# Patient Record
Sex: FEMALE | Race: WHITE | NOT HISPANIC OR LATINO | URBAN - METROPOLITAN AREA
[De-identification: names, ages, dates, MRNs, and addresses within clinical notes are randomized per-mention and may not be internally consistent; named-entity substitution may affect disease eponyms.]

---
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
## 2025-01-03 ENCOUNTER — TREATMENT (OUTPATIENT)
Dept: PHYSICAL THERAPY | Facility: CLINIC | Age: 20
End: 2025-01-03
Payer: COMMERCIAL

## 2025-01-03 DIAGNOSIS — M25.50 ARTHRALGIA, UNSPECIFIED JOINT: Primary | ICD-10-CM

## 2025-01-03 DIAGNOSIS — M35.7 BENIGN JOINT HYPERMOBILITY SYNDROME: ICD-10-CM

## 2025-01-03 PROCEDURE — 97112 NEUROMUSCULAR REEDUCATION: CPT | Mod: GP

## 2025-01-03 PROCEDURE — 97110 THERAPEUTIC EXERCISES: CPT | Mod: GP

## 2025-01-03 NOTE — PROGRESS NOTES
Physical Therapy Treatment    Patient Name: Norma Starr  MRN: [id number]    Today's Date: 1/3/2025    Insurance:  Visit number: 2/4  Insurance Type: Payor: MARY / Plan: ANTHEM HMP / Product Type: *No Product type* /   Authorization or Plan of Care date Range: DATes 12/12/24-1/10/25 4 visit       Diagnosis:   1. Arthralgia, unspecified joint        2. Benign joint hypermobility syndrome            Precautions:  Asthma, anemia, Possible POTS   Fall Risk: None    SUBJECTIVE:   Having EMG RUE. Has abdominal soreness from holiday - feels like a pulled muscle   Compliant with HEP = yes     OBJECTIVE:  107/67 HR 92    TREATMENT:  - self management - PNE     Nustep level 4 5 min.     NBOS on air ex  60 sec x 3 alessandro     Romberg 30 sec x 3 alessandro     Prone PPU  Prone hip IR ER  Prone ham curls   Hip flex stretch     Standing x 10 alessandro on air ex:  CR  Step taps   Hip abd, extension  Side step taps   Step up     seated alessandro 12 x:   Diaphragmatic breathing x 4   Shoulder flexion, bench press with wand x 12 each   Shoulder PNF AROM   Shoulder wand       ASSESSMENT:  Pt had lower abdominal muscle soreness from over the holiday. Prone stretching provided. Pt able to elizabeth well with no reports of pain during exercise progression. Pt with improved elizabeth of activity. Completed CKC exercise well with no UE support. Advancing well. Pt to have EMG next week for RUE.       PLAN:    Progress as able to elizabeth. Use of protocol and assess reaction/tolerance.     Access Code: [email or web address]  URL: [email or web address]  Date: 01/03/2025  Prepared by: Padma Briones    Exercises  - Prone Press Up On Elbows  - 1-2 x daily - 7 x weekly - 1-2 sets - 5 reps - 5 hold  - Prone Knee Flexion AROM  - 1-2 x daily - 7 x weekly - 1-2 sets - 10 reps  - Prone Alternating Bent Leg Fallout  - 1-2 x daily - 7 x weekly - 1-2 sets - 10 reps  - Prone Figure 4 Stretch  - 1-2 x daily - 7 x weekly - 1-2 sets - 5 reps - 15 hold  - Downward Dog  - 1-2 x daily - 7  x weekly - 1-2 sets - 5 reps - 15 hold  - Half Kneeling Dorsiflexion Stretch at Wall  - 1-2 x daily - 7 x weekly - 1-2 sets - 5 reps - 15 hold  - Standing Gastroc Stretch  - 1-2 x daily - 7 x weekly - 1-2 sets - 5 reps - 15 hold    Billing:     PT Therapeutic Procedures Time Entry  Neuromuscular Re-Education Time Entry: 20  Therapeutic Exercise Time Entry: 20

## 2025-01-06 ENCOUNTER — APPOINTMENT (OUTPATIENT)
Dept: INTEGRATIVE MEDICINE | Facility: CLINIC | Age: 20
End: 2025-01-06
Payer: COMMERCIAL

## 2025-01-09 ENCOUNTER — HOSPITAL ENCOUNTER (OUTPATIENT)
Dept: NEUROLOGY | Facility: CLINIC | Age: 20
Discharge: HOME | End: 2025-01-09
Payer: MEDICAID

## 2025-01-09 DIAGNOSIS — R20.2 PARESTHESIAS: ICD-10-CM

## 2025-01-09 PROCEDURE — 95886 MUSC TEST DONE W/N TEST COMP: CPT | Performed by: PSYCHIATRY & NEUROLOGY

## 2025-01-09 PROCEDURE — 95910 NRV CNDJ TEST 7-8 STUDIES: CPT | Performed by: PSYCHIATRY & NEUROLOGY

## 2025-01-10 ENCOUNTER — APPOINTMENT (OUTPATIENT)
Dept: PHYSICAL THERAPY | Facility: CLINIC | Age: 20
End: 2025-01-10
Payer: COMMERCIAL

## 2025-01-14 ENCOUNTER — APPOINTMENT (OUTPATIENT)
Dept: NEUROLOGY | Facility: CLINIC | Age: 20
End: 2025-01-14
Payer: COMMERCIAL

## 2025-01-15 ENCOUNTER — HOSPITAL ENCOUNTER (OUTPATIENT)
Dept: NEUROLOGY | Facility: HOSPITAL | Age: 20
Discharge: HOME | End: 2025-01-15
Payer: MEDICAID

## 2025-01-15 ENCOUNTER — TELEPHONE (OUTPATIENT)
Dept: NEUROLOGY | Facility: CLINIC | Age: 20
End: 2025-01-15

## 2025-01-15 DIAGNOSIS — R00.0 TACHYCARDIA: ICD-10-CM

## 2025-01-15 PROCEDURE — 95923 AUTONOMIC NRV SYST FUNJ TEST: CPT | Performed by: STUDENT IN AN ORGANIZED HEALTH CARE EDUCATION/TRAINING PROGRAM

## 2025-01-15 PROCEDURE — 95924 ANS PARASYMP & SYMP W/TILT: CPT | Performed by: STUDENT IN AN ORGANIZED HEALTH CARE EDUCATION/TRAINING PROGRAM

## 2025-01-15 NOTE — TELEPHONE ENCOUNTER
----- Message from Trang QUEZADA sent at 1/14/2025 12:46 PM EST -----  Sent mychart.  ----- Message -----  From: Radha Georges DO  Sent: 1/14/2025  12:26 PM EST  To: Trang Callaway MA    Please let the patient know that her EMG study was normal. When symptoms are intermittent, it can often be normal.  She could still try wearing a carpal tunnel wrist splint at night and see if that helps.

## 2025-01-16 ENCOUNTER — OFFICE VISIT (OUTPATIENT)
Dept: URGENT CARE | Age: 20
End: 2025-01-16
Payer: MEDICAID

## 2025-01-16 ENCOUNTER — APPOINTMENT (OUTPATIENT)
Dept: PRIMARY CARE | Facility: CLINIC | Age: 20
End: 2025-01-16
Payer: MEDICAID

## 2025-01-16 DIAGNOSIS — N92.2 EXCESSIVE MENSTRUATION AT PUBERTY: ICD-10-CM

## 2025-01-16 DIAGNOSIS — R04.0 EPISTAXIS: Primary | ICD-10-CM

## 2025-01-16 PROCEDURE — 99203 OFFICE O/P NEW LOW 30 MIN: CPT | Performed by: PHYSICIAN ASSISTANT

## 2025-01-16 NOTE — PROGRESS NOTES
"Urgent Care Virtual Video Visit    Patient Location: home  Provider Location: [facility]    Video visit completed with realtime synchronous video/audio connection. Informed consent was obtained from the patient. Patient was made aware that my evaluation and diagnosis are limited due to the fact that we are not in the same room during the interview and that this is a virtual encounter that took place via videoconferencing. Patient verbalized understanding.     I have communicated my name and active licensure. The pt identify and physical location were verified at the time of the visit. The patient has been informed of the risks and benefits of and alternatives to treatment through a remote evaluation and consents to proceed with the evaluation remotely. This visit was conducted using video and audio.    HPI-  This is a 19 yr old female requesting evaluation for nosebleed x 3 since last night and heavy menstrual bleeding that started last night. Feels \"cold\", \"shaky\" and fatigued. No illness sxs.    ROS-as stated in he HPI, is otherwise non contributory    PE-In NAD, conversant  No exam, virtual platform    Assessment  Epistaxis  menorrhagia    Plan:  Pt states she feels \"dehydrated\"  Advised ER visit for labs, and hydration if indicated. Unable to perform these tasks in UC setting    Patient disposition: Home    Electronically signed by Radha Schumacher PA-C  3:15 PM    "

## 2025-02-07 ENCOUNTER — OFFICE VISIT (OUTPATIENT)
Dept: URGENT CARE | Age: 20
End: 2025-02-07
Payer: MEDICAID

## 2025-02-07 VITALS
TEMPERATURE: 98.2 F | RESPIRATION RATE: 16 BRPM | OXYGEN SATURATION: 100 % | BODY MASS INDEX: 23.95 KG/M2 | SYSTOLIC BLOOD PRESSURE: 97 MMHG | DIASTOLIC BLOOD PRESSURE: 55 MMHG | HEART RATE: 88 BPM | WEIGHT: 135.2 LBS

## 2025-02-07 DIAGNOSIS — J02.8 PHARYNGITIS DUE TO OTHER ORGANISM: Primary | ICD-10-CM

## 2025-02-07 DIAGNOSIS — R68.89 FLU-LIKE SYMPTOMS: ICD-10-CM

## 2025-02-07 LAB
POC RAPID INFLUENZA A: NEGATIVE
POC RAPID INFLUENZA B: NEGATIVE
POC RAPID STREP: NEGATIVE
POC SARS-COV-2 AG BINAX: NORMAL

## 2025-02-07 ASSESSMENT — PATIENT HEALTH QUESTIONNAIRE - PHQ9
SUM OF ALL RESPONSES TO PHQ9 QUESTIONS 1 AND 2: 0
2. FEELING DOWN, DEPRESSED OR HOPELESS: NOT AT ALL
1. LITTLE INTEREST OR PLEASURE IN DOING THINGS: NOT AT ALL

## 2025-02-07 NOTE — LETTER
February 7, 2025     Patient: Norma Starr   YOB: 2005   Date of Visit: 2/7/2025       To Whom It May Concern:    It is my medical opinion that Norma Starr  May return to work when no fever x 24 hours .    If you have any questions or concerns, please don't hesitate to call.         Sincerely,        Bibiana Harris, JOANN-CNP    CC: No Recipients

## 2025-02-07 NOTE — PROGRESS NOTES
SUBJECTIVE:   Norma Starr is a 19 y.o. adult who complains of congestion, sore throat, swollen glands, nasal blockage, dry cough, and fever for 5 days. Norma denies a history of chest pain, shortness of breath, wheezing, and sputum production and has a history of asthma. Patient denies smoke cigarettes.     OBJECTIVE:  Norma appears well, vital signs are as noted. Ears normal.  Throat and pharynx normal.  Neck supple. No adenopathy in the neck. Nose is congested. Sinuses non tender. The chest is clear, without wheezes or rales.    ASSESSMENT:   viral upper respiratory illness and viral pharyngitis    PLAN:  Symptomatic therapy suggested: push fluids, rest, gargle warm salt water, use vaporizer or mist prn, and return office visit prn if symptoms persist or worsen. Lack of antibiotic effectiveness discussed with Norma. Call or return to clinic prn if these symptoms worsen or fail to improve as anticipated.

## 2025-02-12 ENCOUNTER — OFFICE VISIT (OUTPATIENT)
Dept: URGENT CARE | Age: 20
End: 2025-02-12
Payer: MEDICAID

## 2025-02-12 VITALS
RESPIRATION RATE: 16 BRPM | OXYGEN SATURATION: 99 % | DIASTOLIC BLOOD PRESSURE: 59 MMHG | HEIGHT: 62 IN | BODY MASS INDEX: 23.92 KG/M2 | TEMPERATURE: 98.9 F | HEART RATE: 94 BPM | SYSTOLIC BLOOD PRESSURE: 109 MMHG | WEIGHT: 130 LBS

## 2025-02-12 DIAGNOSIS — R59.9 ADENOPATHY: Primary | ICD-10-CM

## 2025-02-12 LAB — POC RAPID MONO: NEGATIVE

## 2025-02-12 RX ORDER — PREDNISONE 20 MG/1
20 TABLET ORAL DAILY
Qty: 10 TABLET | Refills: 0 | Status: SHIPPED | OUTPATIENT
Start: 2025-02-12 | End: 2025-02-22

## 2025-02-12 RX ORDER — PREDNISONE 20 MG/1
20 TABLET ORAL DAILY
Qty: 10 TABLET | Refills: 0 | Status: SHIPPED | OUTPATIENT
Start: 2025-02-12 | End: 2025-02-12

## 2025-02-12 ASSESSMENT — ENCOUNTER SYMPTOMS
CARDIOVASCULAR NEGATIVE: 1
DEPRESSION: 0
ADENOPATHY: 1
LOSS OF SENSATION IN FEET: 0
SORE THROAT: 1
RESPIRATORY NEGATIVE: 1
MUSCULOSKELETAL NEGATIVE: 1
OCCASIONAL FEELINGS OF UNSTEADINESS: 0
FATIGUE: 1
GASTROINTESTINAL NEGATIVE: 1

## 2025-02-13 ENCOUNTER — OFFICE VISIT (OUTPATIENT)
Dept: PEDIATRICS | Facility: CLINIC | Age: 20
End: 2025-02-13
Payer: MEDICAID

## 2025-02-13 VITALS — BODY MASS INDEX: 24.22 KG/M2 | WEIGHT: 132.4 LBS | TEMPERATURE: 98.6 F

## 2025-02-13 DIAGNOSIS — B34.9 VIRAL ILLNESS: Primary | ICD-10-CM

## 2025-02-13 PROCEDURE — 99214 OFFICE O/P EST MOD 30 MIN: CPT | Performed by: STUDENT IN AN ORGANIZED HEALTH CARE EDUCATION/TRAINING PROGRAM

## 2025-02-13 NOTE — PROGRESS NOTES
"Subjective   Patient ID: Norma Starr \"Norma\" is a 19 y.o. adult who presents for Follow-up.    Norma has been sick for the last 12 days  Low grade fevers  Sore throat  Very fatigued  Slept for 14 hours yesterday (generally only sleeps 6 or 7 hours)  Pain around her lymph nodes.   Went to urgent care last Friday - tested for strep, flu, covid, which were all negative  Felt worse over the weekend, so followed up yesterday at urgent care.   Was given course of steroids  Drinking okay, urinating normally      Just started iron infusion yesterday for anemia.     Objective   Temp 37 °C (98.6 °F)   Wt 60.1 kg (132 lb 6.4 oz)   LMP 01/15/2025   BMI 24.22 kg/m²   BSA: 1.62 meters squared  Growth percentiles: No height on file for this encounter. 58 %ile (Z= 0.20) based on [facility] (Girls, 2-20 Years) weight-for-age data using data from 2/13/2025.     Physical Exam  Constitutional:       Appearance: Normal appearance.   HENT:      Head: Normocephalic and atraumatic.      Right Ear: Tympanic membrane normal.      Left Ear: Tympanic membrane normal.      Nose: No congestion.      Mouth/Throat:      Mouth: Mucous membranes are moist.   Eyes:      Conjunctiva/sclera: Conjunctivae normal.   Cardiovascular:      Rate and Rhythm: Normal rate and regular rhythm.      Heart sounds: No murmur heard.  Pulmonary:      Effort: Pulmonary effort is normal. No respiratory distress.      Breath sounds: Normal breath sounds. No wheezing, rhonchi or rales.   Abdominal:      General: Abdomen is flat. Bowel sounds are normal.      Palpations: Abdomen is soft.   Musculoskeletal:      Cervical back: Normal range of motion and neck supple.   Skin:     Capillary Refill: Capillary refill takes less than 2 seconds.   Neurological:      Mental Status: Norma is alert.               Assessment/Plan   19 y.o., otherwise healthy adult presenting with fatigue, sore throat, low grade fevers, consistent with viral illness. Discussed that these symptoms " could be due to mononucleosis, will obtain blood work today to rule this out. Patient has started steroid course prescribed by urgent care, ok to continue (reviewed recent blood work from 1/15/25). I will call with results. Discussed supportive care.     Problem List Items Addressed This Visit    None      Teresita Thomas MD

## 2025-02-13 NOTE — PROGRESS NOTES
"Subjective   Patient ID: Norma Starr \"Jonathan" is a 19 y.o. adult. They present today with a chief complaint of Fatigue (C/o fatigue, throat hurts, neck pain and pain in arm pits.).    History of Present Illness  Swollen glands in the back of the neck, front of the neck and under armpits.  Describes a sore throat and bodyaches about ten days.   pain from swollen glands.  seen here and tested negative for flu COVID and strep last week.  Presents to urgent care because symptoms are persistent.      History provided by:  Patient   used: No    Fatigue  Associated symptoms: fatigue and sore throat        Past Medical History  Allergies as of 02/12/2025    (No Known Allergies)       (Not in a hospital admission)       Past Medical History:   Diagnosis Date    Allergy status to unspecified drugs, medicaments and biological substances 08/09/2018    History of allergy    Circadian rhythm sleep disorder, unspecified type 02/26/2020    Biological clock disturbance    Enlarged lymph nodes, unspecified 12/01/2018    Reactive lymphadenopathy    Hypertrophy of tonsils 08/09/2018    Tonsillar hypertrophy    Inadequate sleep hygiene 02/26/2020    Inadequate sleep hygiene    Insufficient sleep syndrome 02/26/2020    Insufficient sleep syndrome    Other chronic diseases of tonsils and adenoids 08/09/2018    Tonsil stone    Other fracture of upper and lower end of right fibula, initial encounter for closed fracture 02/08/2022    Other closed fracture of distal end of right fibula, initial encounter    Other specified abnormal findings of blood chemistry 12/03/2018    Low serum vitamin D    Pain in unspecified ankle and joints of unspecified foot 09/22/2020    Ankle pain    Pain in unspecified knee 09/16/2021    Acute knee pain    Patellofemoral disorders, left knee 09/15/2021    Patellofemoral pain syndrome of left knee    Peroneal tendinitis, left leg 02/08/2022    Peroneal tendinitis of left lower extremity    " "Personal history of other diseases of the respiratory system     History of allergic rhinitis    Personal history of other diseases of the respiratory system 08/09/2018    History of recurrent tonsillitis    Personal history of other mental and behavioral disorders 02/24/2020    History of adjustment disorder    Personal history of traumatic brain injury 04/03/2019    History of concussion       Past Surgical History:   Procedure Laterality Date    OTHER SURGICAL HISTORY  02/26/2020    Tonsillectomy with adenoidectomy    OTHER SURGICAL HISTORY  02/26/2020    History Of Prior Surgery        reports that Norma has never smoked. Norma has never used smokeless tobacco. Norma reports current alcohol use. Norma reports that Norma does not currently use drugs.    Review of Systems  Review of Systems   Constitutional:  Positive for fatigue.   HENT:  Positive for sore throat.    Respiratory: Negative.     Cardiovascular: Negative.    Gastrointestinal: Negative.    Musculoskeletal: Negative.    Skin: Negative.    Hematological:  Positive for adenopathy.                                  Objective    Vitals:    02/12/25 1936   BP: 109/59   BP Location: Left arm   Patient Position: Sitting   BP Cuff Size: Small adult   Pulse: 94   Resp: 16   Temp: 37.2 °C (98.9 °F)   TempSrc: Oral   SpO2: 99%   Weight: 59 kg (130 lb)   Height: 1.575 m (5' 2\")     Patient's last menstrual period was 01/15/2025.    Physical Exam  Vitals and nursing note reviewed.   Constitutional:       General: Norma is not in acute distress.     Appearance: Normal appearance. Norma is not ill-appearing.      Comments: Alert oriented well-nourished developed pleasant and cooperative   HENT:      Head: Normocephalic and atraumatic.      Nose: Nose normal.      Mouth/Throat:      Mouth: Mucous membranes are moist.      Pharynx: Oropharynx is clear. No oropharyngeal exudate or posterior oropharyngeal erythema.      Comments: Uvula midline  Eyes:      " Extraocular Movements: Extraocular movements intact.      Conjunctiva/sclera: Conjunctivae normal.      Pupils: Pupils are equal, round, and reactive to light.   Neck:      Comments: Anterior greater than posterior cervical adenopathy.  Musculoskeletal:      Cervical back: Normal range of motion and neck supple.   Lymphadenopathy:      Cervical: Cervical adenopathy present.   Skin:     General: Skin is warm and dry.   Neurological:      General: No focal deficit present.      Mental Status: Norma is alert and oriented to person, place, and time.   Psychiatric:         Mood and Affect: Mood normal.         Behavior: Behavior normal.         Procedures    Point of Care Test & Imaging Results from this visit  No results found for this visit on 02/12/25.   No results found.    Diagnostic study results (if any) were reviewed by Rowan Yao PA-C.    Assessment/Plan   Allergies, medications, history, and pertinent labs/EKGs/Imaging reviewed by Rowan Yao PA-C.     Medical Decision Making  History and physical examination are consistent with a viral infection.  We will check for mononucleosis. If positive, follow up in 2 - 3 weeks.  If neg, follow up in the next few days.  Will treat with steroids.     Orders and Diagnoses  Diagnoses and all orders for this visit:  Adenopathy  -     POCT Infectious mononucleosis antibody manually resulted      Medical Admin Record      Patient disposition: Home    Electronically signed by Rowan Yao PA-C  7:54 PM

## 2025-02-14 LAB
BASOPHILS # BLD AUTO: 53 CELLS/UL (ref 0–200)
BASOPHILS NFR BLD AUTO: 0.5 %
EBV NA IGG SER IA-ACNC: <18 U/ML
EBV VCA IGG SER IA-ACNC: <18 U/ML
EBV VCA IGM SER IA-ACNC: <36 U/ML
EOSINOPHIL # BLD AUTO: 32 CELLS/UL (ref 15–500)
EOSINOPHIL NFR BLD AUTO: 0.3 %
ERYTHROCYTE [DISTWIDTH] IN BLOOD BY AUTOMATED COUNT: 15.5 % (ref 11–15)
HCT VFR BLD AUTO: 41.8 % (ref 35–45)
HGB BLD-MCNC: 12.5 G/DL (ref 11.7–15.5)
LYMPHOCYTES # BLD AUTO: 1806 CELLS/UL (ref 850–3900)
LYMPHOCYTES NFR BLD AUTO: 17.2 %
MCH RBC QN AUTO: 23.3 PG (ref 27–33)
MCHC RBC AUTO-ENTMCNC: 29.9 G/DL (ref 32–36)
MCV RBC AUTO: 78 FL (ref 80–100)
MONOCYTES # BLD AUTO: 872 CELLS/UL (ref 200–950)
MONOCYTES NFR BLD AUTO: 8.3 %
NEUTROPHILS # BLD AUTO: 7739 CELLS/UL (ref 1500–7800)
NEUTROPHILS NFR BLD AUTO: 73.7 %
PLATELET # BLD AUTO: 309 THOUSAND/UL (ref 140–400)
PMV BLD REES-ECKER: 10.8 FL (ref 7.5–12.5)
QUEST EBV PANEL INTERPRETATION:: NORMAL
RBC # BLD AUTO: 5.36 MILLION/UL (ref 3.8–5.1)
WBC # BLD AUTO: 10.5 THOUSAND/UL (ref 3.8–10.8)

## 2025-02-26 ENCOUNTER — APPOINTMENT (OUTPATIENT)
Dept: PULMONOLOGY | Facility: CLINIC | Age: 20
End: 2025-02-26
Payer: MEDICAID

## 2025-03-03 ENCOUNTER — APPOINTMENT (OUTPATIENT)
Dept: HEMATOLOGY/ONCOLOGY | Facility: CLINIC | Age: 20
End: 2025-03-03
Payer: COMMERCIAL

## 2025-03-20 ENCOUNTER — DOCUMENTATION (OUTPATIENT)
Dept: PHYSICAL THERAPY | Facility: CLINIC | Age: 20
End: 2025-03-20
Payer: MEDICAID

## 2025-03-20 NOTE — PROGRESS NOTES
"Physical Therapy    Discharge Summary    Name: Norma Starr \"Jonathan"  MRN: [id number]  : 2005  Date: 25    Discharge Summary: PT    Discharge Information: Date of discharge 2025    Therapy Summary: Pt is discharged from Physical Therapy Services.   Please see last note for discharge details.        Rehab Discharge Reason: Other insurance restrictions   "

## 2025-05-10 ENCOUNTER — OFFICE VISIT (OUTPATIENT)
Dept: URGENT CARE | Age: 20
End: 2025-05-10
Payer: MEDICAID

## 2025-05-10 VITALS
OXYGEN SATURATION: 98 % | TEMPERATURE: 98.2 F | RESPIRATION RATE: 15 BRPM | SYSTOLIC BLOOD PRESSURE: 109 MMHG | DIASTOLIC BLOOD PRESSURE: 65 MMHG | HEART RATE: 89 BPM

## 2025-05-10 DIAGNOSIS — R25.1 TREMOR OF RIGHT HAND: Primary | ICD-10-CM

## 2025-05-10 RX ORDER — PREDNISONE 20 MG/1
40 TABLET ORAL DAILY
Qty: 10 TABLET | Refills: 0 | Status: SHIPPED | OUTPATIENT
Start: 2025-05-10 | End: 2025-05-15

## 2025-05-10 ASSESSMENT — ENCOUNTER SYMPTOMS
TREMORS: 1
WEAKNESS: 1

## 2025-05-10 NOTE — PROGRESS NOTES
"Subjective   Patient ID: Norma Starr \"Norma\" is a 19 y.o. adult. They present today with a chief complaint of Other (Tremors in right hand with movement X 1 day. TD-MA).    History of Present Illness  HPI  Patient presents to urgent care for a chief complaint of right hand and right arm tremor and weakness that started yesterday, patient states has been an ongoing issue has been seen by neurology December of last year and did have EMG which did not show any abnormalities, patient denies any known injuries or trauma  Past Medical History  Allergies as of 05/10/2025    (No Known Allergies)       Prescriptions Prior to Admission[1]     Medical History[2]    Surgical History[3]     reports that Norma has never smoked. Norma has never used smokeless tobacco. Norma reports current alcohol use. Norma reports that Norma does not currently use drugs.    Review of Systems  Review of Systems   Neurological:  Positive for tremors and weakness.                                  Objective    Vitals:    05/10/25 1203   BP: 109/65   Pulse: 89   Resp: 15   Temp: 36.8 °C (98.2 °F)   SpO2: 98%     No LMP recorded.    Physical Exam  Vitals and nursing note reviewed.   Constitutional:       General: Norma is not in acute distress.     Appearance: Normal appearance. Norma is not ill-appearing, toxic-appearing or diaphoretic.   Musculoskeletal:         General: No swelling or deformity.      Comments: Upon entering the room patient does hold her right arm across her body, patient is able to flex extend at elbow, able to flex extend at wrist, neurovascularly intact in the hands, mild shaking can be seen when patient extends arm   Skin:     Findings: No bruising, erythema, lesion or rash.   Neurological:      Mental Status: Norma is alert and oriented to person, place, and time.   Psychiatric:         Mood and Affect: Mood normal.         Behavior: Behavior normal.         Procedures    Point of Care Test & Imaging Results from " this visit  No results found for this visit on 05/10/25.   Imaging  No results found.    Cardiology, Vascular, and Other Imaging  No other imaging results found for the past 2 days      Diagnostic study results (if any) were reviewed by Iam Fuller PA-C.    Assessment/Plan   Allergies, medications, history, and pertinent labs/EKGs/Imaging reviewed by Iam Fuller PA-C.     Medical Decision Making  I did discuss with patient do not have adequate imaging only of x-ray available to me, patient will be placed on a prednisone, did discuss with patient following up with neurology which she already has and placed patient verbalized understanding is agreeable to plan discharge emergent care A+O x 4 stable condition no signs of distress neurovascular intact    Orders and Diagnoses  Diagnoses and all orders for this visit:  Tremor of right hand  -     predniSONE (Deltasone) 20 mg tablet; Take 2 tablets (40 mg) by mouth once daily for 5 days.      Medical Admin Record      Patient disposition: Home    Electronically signed by Iam Fuller PA-C  12:18 PM           [1] (Not in a hospital admission)   [2]   Past Medical History:  Diagnosis Date    Allergy status to unspecified drugs, medicaments and biological substances 08/09/2018    History of allergy    Circadian rhythm sleep disorder, unspecified type 02/26/2020    Biological clock disturbance    Enlarged lymph nodes, unspecified 12/01/2018    Reactive lymphadenopathy    Hypertrophy of tonsils 08/09/2018    Tonsillar hypertrophy    Inadequate sleep hygiene 02/26/2020    Inadequate sleep hygiene    Insufficient sleep syndrome 02/26/2020    Insufficient sleep syndrome    Other chronic diseases of tonsils and adenoids 08/09/2018    Tonsil stone    Other fracture of upper and lower end of right fibula, initial encounter for closed fracture 02/08/2022    Other closed fracture of distal end of right fibula, initial encounter    Other specified abnormal findings  of blood chemistry 12/03/2018    Low serum vitamin D    Pain in unspecified ankle and joints of unspecified foot 09/22/2020    Ankle pain    Pain in unspecified knee 09/16/2021    Acute knee pain    Patellofemoral disorders, left knee 09/15/2021    Patellofemoral pain syndrome of left knee    Peroneal tendinitis, left leg 02/08/2022    Peroneal tendinitis of left lower extremity    Personal history of other diseases of the respiratory system     History of allergic rhinitis    Personal history of other diseases of the respiratory system 08/09/2018    History of recurrent tonsillitis    Personal history of other mental and behavioral disorders 02/24/2020    History of adjustment disorder    Personal history of traumatic brain injury 04/03/2019    History of concussion   [3]   Past Surgical History:  Procedure Laterality Date    OTHER SURGICAL HISTORY  02/26/2020    Tonsillectomy with adenoidectomy    OTHER SURGICAL HISTORY  02/26/2020    History Of Prior Surgery